# Patient Record
Sex: FEMALE | Race: WHITE
[De-identification: names, ages, dates, MRNs, and addresses within clinical notes are randomized per-mention and may not be internally consistent; named-entity substitution may affect disease eponyms.]

---

## 2018-11-15 ENCOUNTER — HOSPITAL ENCOUNTER (EMERGENCY)
Dept: HOSPITAL 96 - M.ERS | Age: 63
Discharge: HOME | End: 2018-11-15
Payer: COMMERCIAL

## 2018-11-15 VITALS — SYSTOLIC BLOOD PRESSURE: 104 MMHG | DIASTOLIC BLOOD PRESSURE: 57 MMHG

## 2018-11-15 VITALS — BODY MASS INDEX: 25.83 KG/M2 | HEIGHT: 65 IN | WEIGHT: 155.01 LBS
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
## 2018-11-15 DIAGNOSIS — I50.9: ICD-10-CM

## 2018-11-15 DIAGNOSIS — Z95.0: ICD-10-CM

## 2018-11-15 DIAGNOSIS — Z98.890: ICD-10-CM

## 2018-11-15 DIAGNOSIS — I48.91: ICD-10-CM

## 2018-11-15 DIAGNOSIS — S00.03XA: Primary | ICD-10-CM
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
## 2018-11-15 DIAGNOSIS — E87.6: ICD-10-CM
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
## 2018-11-15 DIAGNOSIS — I11.0: ICD-10-CM

## 2018-11-15 LAB
ABSOLUTE BASOPHILS: 0.1 THOU/UL (ref 0–0.2)
ABSOLUTE EOSINOPHILS: 0.2 THOU/UL (ref 0–0.7)
ABSOLUTE MONOCYTES: 0.6 THOU/UL (ref 0–1.2)
ALBUMIN SERPL-MCNC: 4.2 G/DL (ref 3.4–5)
ALP SERPL-CCNC: 70 U/L (ref 46–116)
ALT SERPL-CCNC: 52 U/L (ref 30–65)
ANION GAP SERPL CALC-SCNC: 8 MMOL/L (ref 7–16)
APTT BLD: 38.3 SECONDS (ref 25–31.3)
AST SERPL-CCNC: 55 U/L (ref 15–37)
BASOPHILS NFR BLD AUTO: 1.1 %
BILIRUB SERPL-MCNC: 0.6 MG/DL
BUN SERPL-MCNC: 40 MG/DL (ref 7–18)
CALCIUM SERPL-MCNC: 9.4 MG/DL (ref 8.5–10.1)
CHLORIDE SERPL-SCNC: 91 MMOL/L (ref 98–107)
CO2 SERPL-SCNC: 40 MMOL/L (ref 21–32)
CREAT SERPL-MCNC: 1.4 MG/DL (ref 0.6–1.3)
EOSINOPHIL NFR BLD: 3.2 %
GLUCOSE SERPL-MCNC: 109 MG/DL (ref 70–99)
GRANULOCYTES NFR BLD MANUAL: 52 %
HCT VFR BLD CALC: 37.9 % (ref 37–47)
HGB BLD-MCNC: 12.9 GM/DL (ref 12–15)
INR PPP: 3
LYMPHOCYTES # BLD: 1.8 THOU/UL (ref 0.8–5.3)
LYMPHOCYTES NFR BLD AUTO: 32.8 %
MCH RBC QN AUTO: 30.9 PG (ref 26–34)
MCHC RBC AUTO-ENTMCNC: 34.1 G/DL (ref 28–37)
MCV RBC: 90.7 FL (ref 80–100)
MONOCYTES NFR BLD: 10.9 %
MPV: 9.3 FL. (ref 7.2–11.1)
NEUTROPHILS # BLD: 2.8 THOU/UL (ref 1.6–8.1)
NUCLEATED RBCS: 0 /100WBC
PLATELET COUNT*: 151 THOU/UL (ref 150–400)
POTASSIUM SERPL-SCNC: 2.4 MMOL/L (ref 3.5–5.1)
PROT SERPL-MCNC: 8.1 G/DL (ref 6.4–8.2)
PROTHROMBIN TIME: 31 SECONDS (ref 9.2–11.5)
RBC # BLD AUTO: 4.18 MIL/UL (ref 4.2–5)
RDW-CV: 13.5 % (ref 10.5–14.5)
SODIUM SERPL-SCNC: 139 MMOL/L (ref 136–145)
WBC # BLD AUTO: 5.4 THOU/UL (ref 4–11)

## 2018-11-15 NOTE — EKG
[place] [address]
Phone:  [phone]                     ELECTROCARDIOGRAM REPORT      
_______________________________________________________________________________
 
Name:       NURIS DUONG                Room:                      [facility]#:  [id number]      Account #:      [id number]  
Admission:  11/15/18     Attend Phys:                         
Discharge:  11/15/18     Date of Birth:  [birth year]  
         Report #: [id number]
    [id number]
_______________________________________________________________________________
THIS REPORT FOR:  //name//                      
 
                         [facility] ED
                                       
Test Date:    2018-11-15               Test Time:    06:10:58
Pat Name:     NURIS SIERRAREGINALDDARIAN            Department:   
Patient ID:   [id number]            Room:          
Gender:       F                        Technician:   HUYEN KIM
:          1955               Requested By: Bacilio Dennis
Order Number: [id number] MD:   Christiano Lebron
                                 Measurements
Intervals                              Axis          
Rate:         66                       P:            85
CO:           149                      QRS:          127
QRSD:         150                      T:            24
QT:           510                                    
QTc:          535                                    
                           Interpretive Statements
Atrial-ventricular dual-paced rhythm
No further analysis attempted due to paced rhythm
Artifact in lead(s) V3
No previous ECG available for comparison
 
Electronically Signed On 11- 17:28:56 CST by Christiano Lebron
[email or web address]
 
 
 
 
 
 
 
 
 
 
 
 
 
 
 
 
 
 
  <ELECTRONICALLY SIGNED>
                                           By: Christiano Lebron MD, Merged with [facility]   
  11/15/18     1728
D: 11/15/18 0610   _____________________________________
T: 11/15/18 0610   Christiano Lebron MD, FAC     /EPI